# Patient Record
Sex: FEMALE | Race: WHITE | Employment: OTHER | URBAN - METROPOLITAN AREA
[De-identification: names, ages, dates, MRNs, and addresses within clinical notes are randomized per-mention and may not be internally consistent; named-entity substitution may affect disease eponyms.]

---

## 2019-02-20 ENCOUNTER — THERAPY VISIT (OUTPATIENT)
Dept: PHYSICAL THERAPY | Facility: CLINIC | Age: 74
End: 2019-02-20
Payer: COMMERCIAL

## 2019-02-20 DIAGNOSIS — Z47.1 AFTERCARE FOLLOWING SHOULDER JOINT REPLACEMENT SURGERY, UNSPECIFIED LATERALITY: ICD-10-CM

## 2019-02-20 DIAGNOSIS — Z96.619 AFTERCARE FOLLOWING SHOULDER JOINT REPLACEMENT SURGERY, UNSPECIFIED LATERALITY: ICD-10-CM

## 2019-02-20 DIAGNOSIS — M25.512 ACUTE PAIN OF LEFT SHOULDER: ICD-10-CM

## 2019-02-20 PROCEDURE — G8987 SELF CARE CURRENT STATUS: HCPCS | Mod: GP | Performed by: PHYSICAL THERAPIST

## 2019-02-20 PROCEDURE — 97161 PT EVAL LOW COMPLEX 20 MIN: CPT | Mod: GP | Performed by: PHYSICAL THERAPIST

## 2019-02-20 PROCEDURE — G8988 SELF CARE GOAL STATUS: HCPCS | Mod: GP | Performed by: PHYSICAL THERAPIST

## 2019-02-20 PROCEDURE — 97110 THERAPEUTIC EXERCISES: CPT | Mod: GP | Performed by: PHYSICAL THERAPIST

## 2019-02-20 NOTE — LETTER
[facility]  [address]  [place] [address]  [phone]    2019    Re: Danica Marie   :   1945  MRN:  [id number]   REFERRING PHYSICIAN:   [facility]    Date of Initial Evaluation:  19  Visits:  Rxs Used: 1  Reason for Referral:     Acute pain of left shoulder  Aftercare following shoulder joint replacement surgery, unspecified laterality    [facility] Initial Evaluation    Subjective:  SPADI    The history is provided by the patient. No  was used.   Danica Marie is a 73 year old female with a left shoulder condition.  Condition occurred with:  Repetition/overuse and degenerative joint disease.  Condition occurred: for unknown reasons.  This is a new condition  19.    Patient reports pain:  In the joint.  Radiates to:  Shoulder.  Pain is described as aching and is intermittent and reported as 8/10.  Associated symptoms:  Painful arc, loss of strength and loss of motion/stiffness. Pain is the same all the time.  Symptoms are exacerbated by carrying, lifting, lying on extremity, using arm behind back, using arm at shoulder level and using arm overhead (restricted arm movement to table top, no greater than a few ounces of weight and using a sling yet. ) and relieved by bracing/immobilizing, heat and ice.  Since onset symptoms are gradually improving.  Special tests:  X-ray and MRI.  Previous treatment includes physical therapy.  There was moderate and mild improvement following previous treatment.  General health as reported by patient is good.  Pertinent medical history includes:  Anemia, asthma, overweight, depression, fibromyalgia and osteoarthritis.  Medical allergies: yes (opiods ).  Other surgeries include:  Orthopedic surgery and other (R shoulder, L knee ).  Current medications:  Anti-depressants, high blood pressure medication, hormone replacement  therapy and pain medication (Tylenol).  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  Primary job tasks include:  Lifting, driving and prolonged sitting.  Barriers include:  None as reported by the patient.  Red flags:  None as reported by the patient.    Objective:  Standing Alignment:    Cervical/Thoracic:  Forward head and thoracic kyphosis increased  Shoulder/UE:  Rounded shoulders    Gait:    Gait Type:  Antalgic   Weight Bearing Status:  WBAT   Assistive Devices:  None  Flexibility/Screens:   Negative screens: Cervical   Neurological: She is alert. She has normal reflexes. No cranial nerve deficit or sensory deficit.     Shoulder Evaluation:  ROM:    PROM:    Flexion:  Left:  70    Right: 130    Internal Rotation:  Left:  40    Right:  80  External Rotation:  Left:  0    Right:  50    Palpation:  Palpation assessed shoulder: incision healing well and clean. She does cover the distal end of her incision with a band aid for sensitivity from clothes. and occasional drainage yet.     Assessment/Plan:    Patient is a 73 year old female with left side shoulder complaints.    Patient has the following significant findings with corresponding treatment plan.                Diagnosis 1:  L shoulder  rTSA   Pain -  hot/cold therapy, self management, education, directional preference exercise and home program  Decreased ROM/flexibility - manual therapy and therapeutic exercise  Decreased strength - therapeutic exercise and therapeutic activities    Therapy Evaluation Codes:   1) History comprised of:   Personal factors that impact the plan of care:      Age, Past/current experiences and post op MD protocol with precautions, contraindications and rehab progression phases .    Comorbidity factors that impact the plan of care are:      Depression, Fibromyalgia, High blood pressure, Osteoarthritis and Overweight.     Medications impacting care: Anti-depressant  and Pain.  2) Examination of Body Systems comprised of:   Body structures and functions that impact the plan of care:      Shoulder.   Activity limitations that impact the plan of care are:      Bathing, Cooking, Driving, Dressing, Lifting, Working, Sleeping and Laying down.  3) Clinical presentation characteristics are:   Stable/Uncomplicated.  4) Decision-Making    Low complexity using standardized patient assessment instrument and/or measureable assessment of functional outcome.    Cumulative Therapy Evaluation is: Low complexity.  Previous and current functional limitations:  (See Goal Flow Sheet for this information)    Short term and Long term goals: (See Goal Flow Sheet for this information)   Communication ability:  Patient appears to be able to clearly communicate and understand verbal and written communication and follow directions correctly.  Treatment Explanation - The following has been discussed with the patient:   RX ordered/plan of care  Anticipated outcomes  Possible risks and side effects  This patient would benefit from PT intervention to resume normal activities.   Rehab potential is good.    Frequency:  1 X week, once daily  Duration:  for 4 weeks tapering to 1 X a week  Every other week over 12 weeks  Discharge Plan:  Achieve all LTG.  Independent in home treatment program.  Reach maximal therapeutic benefit.    Thank you for your referral.    INQUIRIES  Therapist: Jason Hines, PT, ATC, Cert. [facility]  [address] [place] [address]  Phone: [phone]  Fax: [phone]

## 2019-02-21 PROBLEM — M25.512 ACUTE PAIN OF LEFT SHOULDER: Status: ACTIVE | Noted: 2019-02-21

## 2019-02-21 PROBLEM — Z96.619 AFTERCARE FOLLOWING SHOULDER JOINT REPLACEMENT SURGERY, UNSPECIFIED LATERALITY: Status: ACTIVE | Noted: 2019-02-21

## 2019-02-21 PROBLEM — Z47.1 AFTERCARE FOLLOWING SHOULDER JOINT REPLACEMENT SURGERY, UNSPECIFIED LATERALITY: Status: ACTIVE | Noted: 2019-02-21

## 2019-02-21 NOTE — PROGRESS NOTES
[facility] Initial Evaluation  Subjective:  SPADI 21/100       The history is provided by the patient. No  was used.   Danica Marie is a 73 year old female with a left shoulder condition.  Condition occurred with:  Repetition/overuse and degenerative joint disease.  Condition occurred: for unknown reasons.  This is a new condition  1/23/19.    Patient reports pain:  In the joint.  Radiates to:  Shoulder.  Pain is described as aching and is intermittent and reported as 8/10.  Associated symptoms:  Painful arc, loss of strength and loss of motion/stiffness. Pain is the same all the time.  Symptoms are exacerbated by carrying, lifting, lying on extremity, using arm behind back, using arm at shoulder level and using arm overhead (restricted arm movement to table top, no greater than a few ounces of weight and using a sling yet. ) and relieved by bracing/immobilizing, heat and ice.  Since onset symptoms are gradually improving.  Special tests:  X-ray and MRI.  Previous treatment includes physical therapy.  There was moderate and mild improvement following previous treatment.  General health as reported by patient is good.  Pertinent medical history includes:  Anemia, asthma, overweight, depression, fibromyalgia and osteoarthritis.  Medical allergies: yes (opiods ).  Other surgeries include:  Orthopedic surgery and other (R shoulder, L knee ).  Current medications:  Anti-depressants, high blood pressure medication, hormone replacement therapy and pain medication (Tylenol).  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  Primary job tasks include:  Lifting, driving and prolonged sitting.    Barriers include:  None as reported by the patient.    Red flags:  None as reported by the patient.                        Objective:  Standing Alignment:    Cervical/Thoracic:  Forward head and thoracic kyphosis  increased  Shoulder/UE:  Rounded shoulders              Gait:    Gait Type:  Antalgic   Weight Bearing Status:  WBAT   Assistive Devices:  None      Flexibility/Screens:   Negative screens: Cervical           Neurological: She is alert. She has normal reflexes. No cranial nerve deficit or sensory deficit.                      Shoulder Evaluation:  ROM:    PROM:    Flexion:  Left:  70    Right: 130          Internal Rotation:  Left:  40    Right:  80  External Rotation:  Left:  0    Right:  50                          Palpation:  Palpation assessed shoulder: incision healing well and clean. She does cover the distal end of her incision with a band aid for sensitivity from clothes. and occasional drainage yet.                                          General     ROS    Assessment/Plan:    Patient is a 73 year old female with left side shoulder complaints.    Patient has the following significant findings with corresponding treatment plan.                Diagnosis 1:  L shoulder  rTSA   Pain -  hot/cold therapy, self management, education, directional preference exercise and home program  Decreased ROM/flexibility - manual therapy and therapeutic exercise  Decreased strength - therapeutic exercise and therapeutic activities    Therapy Evaluation Codes:   1) History comprised of:   Personal factors that impact the plan of care:      Age, Past/current experiences and post op MD protocol with precautions, contraindications and rehab progression phases .    Comorbidity factors that impact the plan of care are:      Depression, Fibromyalgia, High blood pressure, Osteoarthritis and Overweight.     Medications impacting care: Anti-depressant and Pain.  2) Examination of Body Systems comprised of:   Body structures and functions that impact the plan of care:      Shoulder.   Activity limitations that impact the plan of care are:      Bathing, Cooking, Driving, Dressing, Lifting, Working, Sleeping and Laying down.  3) Clinical  presentation characteristics are:   Stable/Uncomplicated.  4) Decision-Making    Low complexity using standardized patient assessment instrument and/or measureable assessment of functional outcome.  Cumulative Therapy Evaluation is: Low complexity.    Previous and current functional limitations:  (See Goal Flow Sheet for this information)    Short term and Long term goals: (See Goal Flow Sheet for this information)     Communication ability:  Patient appears to be able to clearly communicate and understand verbal and written communication and follow directions correctly.  Treatment Explanation - The following has been discussed with the patient:   RX ordered/plan of care  Anticipated outcomes  Possible risks and side effects  This patient would benefit from PT intervention to resume normal activities.   Rehab potential is good.    Frequency:  1 X week, once daily  Duration:  for 4 weeks tapering to 1 X a week  Every other week over 12 weeks  Discharge Plan:  Achieve all LTG.  Independent in home treatment program.  Reach maximal therapeutic benefit.    Please refer to the daily flowsheet for treatment today, total treatment time and time spent performing 1:1 timed codes.

## 2019-03-06 ENCOUNTER — THERAPY VISIT (OUTPATIENT)
Dept: PHYSICAL THERAPY | Facility: CLINIC | Age: 74
End: 2019-03-06
Payer: COMMERCIAL

## 2019-03-06 DIAGNOSIS — Z47.1 AFTERCARE FOLLOWING LEFT SHOULDER JOINT REPLACEMENT SURGERY: ICD-10-CM

## 2019-03-06 DIAGNOSIS — Z96.612 AFTERCARE FOLLOWING LEFT SHOULDER JOINT REPLACEMENT SURGERY: ICD-10-CM

## 2019-03-06 DIAGNOSIS — M25.512 ACUTE PAIN OF LEFT SHOULDER: Primary | ICD-10-CM

## 2019-03-06 PROCEDURE — 97110 THERAPEUTIC EXERCISES: CPT | Mod: GP | Performed by: PHYSICAL THERAPIST

## 2019-03-06 PROCEDURE — 97112 NEUROMUSCULAR REEDUCATION: CPT | Mod: GP | Performed by: PHYSICAL THERAPIST

## 2019-03-29 ENCOUNTER — THERAPY VISIT (OUTPATIENT)
Dept: PHYSICAL THERAPY | Facility: CLINIC | Age: 74
End: 2019-03-29
Payer: COMMERCIAL

## 2019-03-29 DIAGNOSIS — M25.512 ACUTE PAIN OF LEFT SHOULDER: Primary | ICD-10-CM

## 2019-03-29 DIAGNOSIS — Z96.612 S/P REVERSE TOTAL SHOULDER ARTHROPLASTY, LEFT: ICD-10-CM

## 2019-03-29 PROCEDURE — G8987 SELF CARE CURRENT STATUS: HCPCS | Mod: GP | Performed by: PHYSICAL THERAPIST

## 2019-03-29 PROCEDURE — 97110 THERAPEUTIC EXERCISES: CPT | Mod: GP | Performed by: PHYSICAL THERAPIST

## 2019-03-29 PROCEDURE — G8988 SELF CARE GOAL STATUS: HCPCS | Mod: GP | Performed by: PHYSICAL THERAPIST

## 2019-03-29 NOTE — LETTER
[facility]  [address] [place] [id number] [phone]    2019    Re: Danica Marie   :   1945  MRN:  [id number]   REFERRING PHYSICIAN:   [facility]    Date of Initial Evaluation:  19  Visits:  Rxs Used: 3  Reason for Referral:     Acute pain of left shoulder  S/P reverse total shoulder arthroplasty, left    PROGRESS  REPORT  Progress reporting period is from 19 to 3/19/19. 3 visits .     SUBJECTIVE   9 weeks post op r TSA. Panfree at rest. Sleeping well and gradually using her arm with day to day activity. Danica is happy with her outcome to this point.    Initial Pain level: 810   Changes in function: Yes, see goal flow sheet for change in function   Adverse reactions: None    OBJECTIVE  70 IR, 45 ER, 130 flex, AROM .   Patient is currently following every 2-3 weeks in therapy for HEP management and inbetween by phone if needed.     HEP: scap retraction, supine reverse pendulum and ceiling punch, Adductor isometric, gentle table top stretch 3-4x./week, daily with ROM.     ASSESSMENT/PLAN  Updated problem list and treatment plan: Diagnosis 1:  Post op rTSA     STG/LTGs have been met or progress has been made towards goals:  Yes (See Goal flow sheet completed today.)  Assessment of Progress: The patient's condition is improving.  The patient's condition has potential to improve.  Self Management Plans:  Patient has been instructed in a home treatment program.    Recommendations:  This patient would benefit from continued therapy.     Frequency:  1 X week, every other week  once daily  Duration:  for 12 weeks    Thank you for your referral.    INQUIRIES  Therapist: Jason Hines, PT, ATC, Cert. MDT  [facility]  [address] [place] [address]  Phone: [phone]  Fax: [phone]

## 2019-03-31 NOTE — PROGRESS NOTES
Subjective:  HPI                    Objective:  System    Physical Exam    General     ROS    Assessment/Plan:    PROGRESS  REPORT    Progress reporting period is from 2/20/19 to 3/19/19. 3 visits .     SUBJECTIVE   9 weeks post op r TSA. Panfree at rest. Sleeping well and gradually using her arm with day to day activity. Danica is happy with her outcome to this point.        Initial Pain level: 8/10   Changes in function: Yes, see goal flow sheet for change in function   Adverse reactions: None;   ,         OBJECTIVE  : 70 IR, 45 ER, 130 flex, AROM .     Patient is currently following every 2-3 weeks in therapy for HEP management and inbetween by phone if needed.       HEP: scap retraction, supine reverse pendulum and ceiling punch, Adductor isometric, gentle table top stretch 3-4x./week, daily with ROM.     ASSESSMENT/PLAN  Updated problem list and treatment plan: Diagnosis 1:  Post op rTSA     STG/LTGs have been met or progress has been made towards goals:  Yes (See Goal flow sheet completed today.)  Assessment of Progress: The patient's condition is improving.  The patient's condition has potential to improve.  Self Management Plans:  Patient has been instructed in a home treatment program.    Recommendations:  This patient would benefit from continued therapy.     Frequency:  1 X week, every other week  once daily  Duration:  for 12 weeks        Please refer to the daily flowsheet for treatment today, total treatment time and time spent performing 1:1 timed codes.

## 2019-04-05 ENCOUNTER — THERAPY VISIT (OUTPATIENT)
Dept: PHYSICAL THERAPY | Facility: CLINIC | Age: 74
End: 2019-04-05
Payer: COMMERCIAL

## 2019-04-05 DIAGNOSIS — Z47.1 AFTERCARE FOLLOWING LEFT SHOULDER JOINT REPLACEMENT SURGERY: ICD-10-CM

## 2019-04-05 DIAGNOSIS — Z96.612 AFTERCARE FOLLOWING LEFT SHOULDER JOINT REPLACEMENT SURGERY: ICD-10-CM

## 2019-04-05 DIAGNOSIS — M25.512 ACUTE PAIN OF LEFT SHOULDER: Primary | ICD-10-CM

## 2019-04-05 PROCEDURE — G8987 SELF CARE CURRENT STATUS: HCPCS | Mod: GP | Performed by: PHYSICAL THERAPIST

## 2019-04-05 PROCEDURE — 97112 NEUROMUSCULAR REEDUCATION: CPT | Mod: GP | Performed by: PHYSICAL THERAPIST

## 2019-04-05 PROCEDURE — G8988 SELF CARE GOAL STATUS: HCPCS | Mod: GP | Performed by: PHYSICAL THERAPIST

## 2019-04-05 PROCEDURE — 97110 THERAPEUTIC EXERCISES: CPT | Mod: GP | Performed by: PHYSICAL THERAPIST

## 2019-05-09 ENCOUNTER — THERAPY VISIT (OUTPATIENT)
Dept: PHYSICAL THERAPY | Facility: CLINIC | Age: 74
End: 2019-05-09
Payer: COMMERCIAL

## 2019-05-09 DIAGNOSIS — M25.512 ACUTE PAIN OF LEFT SHOULDER: Primary | ICD-10-CM

## 2019-05-09 DIAGNOSIS — Z47.1 AFTERCARE FOLLOWING LEFT SHOULDER JOINT REPLACEMENT SURGERY: ICD-10-CM

## 2019-05-09 DIAGNOSIS — Z96.612 AFTERCARE FOLLOWING LEFT SHOULDER JOINT REPLACEMENT SURGERY: ICD-10-CM

## 2019-05-09 PROCEDURE — G8987 SELF CARE CURRENT STATUS: HCPCS | Mod: GP | Performed by: PHYSICAL THERAPIST

## 2019-05-09 PROCEDURE — 97110 THERAPEUTIC EXERCISES: CPT | Mod: GP | Performed by: PHYSICAL THERAPIST

## 2019-05-09 PROCEDURE — G8988 SELF CARE GOAL STATUS: HCPCS | Mod: GP | Performed by: PHYSICAL THERAPIST

## 2019-05-09 PROCEDURE — 97112 NEUROMUSCULAR REEDUCATION: CPT | Mod: GP | Performed by: PHYSICAL THERAPIST

## 2019-05-09 NOTE — LETTER
[facility]  [address]  [place] [address]  [phone]    May 14, 2019    Re: Danica Marie   :   1945  MRN:  [id number]   REFERRING PHYSICIAN:   [facility]    Date of Initial Evaluation:  19  Visits:  Rxs Used: 5  Reason for Referral:     Acute pain of left shoulder  Aftercare following left shoulder joint replacement surgery    PROGRESS  REPORT  Progress reporting period is from 2019 to May 9, 2019.  5 visits.    SUBJECTIVE  Danica is 12 weeks postoperative reverse total shoulder replacement by Dr. Husain.  She is pain-free.    She is happy with her outcome as she begins to engage back to most of her normal lifestyle.  Danica Is limited from  heavy lifting of greater than 10 pounds, reaching overhead and anything that would reproduce discomfort.    She is on track with postoperative reverse total shoulder replacement program.  She follows up with therapy on a biweekly basis at this point, emphasis to gradual functional range of motion restoration, modified strengthening program adherent to her physicians program.  Current Pain level: 1/10   Initial Pain level: 8/10   Changes in function: No changes noted in function since last SOAP note   Adverse reactions: None     OBJECTIVE  External rotation 50 degrees, internal rotation 85 degrees, active forward flexion 150 degrees.   Strength testing is not done at this point.    She avoids posterior reach, arm extension beyond midline, and lifting greater than 5 to 10 pounds such as grocery shopping is maximum.  Danica  has 1 additional appointment in physical therapy prior to following up with her physician.   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    ASSESSMENT/PLAN  Updated problem list and treatment plan: Diagnosis 1: Postoperative open left reverse total shoulder replacement and 3/13/2019 Decreased strength - therapeutic exercise and therapeutic activities  STG/LTGs have been met or progress has been made towards goals:  Yes (See Goal flow sheet completed today.)  Assessment of Progress: The patient's condition is improving.  The patient's condition has potential to improve.  Self Management Plans:  Patient has been instructed in a home treatment program.    Recommendations:  This patient would benefit from continued therapy.     Frequency: 1 X week, every other week, once daily  Duration:  for 12 weeks    This patient is ready to be discharged from therapy and continue their home treatment program.    Thank you for your referral.    INQUIRIES  Therapist: Jason Hines, PT, ATC, Cert. MDT  [facility]  [address] [place] [address]  Phone: [phone]  Fax: [phone]

## 2019-05-14 NOTE — PROGRESS NOTES
Subjective:  HPI                    Objective:  System    Physical Exam    General     ROS    Assessment/Plan:    PROGRESS  REPORT    Progress reporting period is from February 20, 2019 to May 9, 2019.  5 visits.    SUBJECTIVE   Danica is 12 weeks postoperative reverse total shoulder replacement by Dr. Husain.  She is pain-free.      She is happy with her outcome as she begins to engage back to most of her normal lifestyle.    Danica Is limited from  heavy lifting of greater than 10 pounds, reaching overhead and anything that would reproduce discomfort.      She is on track with postoperative reverse total shoulder replacement program.  She follows up with therapy on a biweekly basis at this point, emphasis to gradual functional range of motion restoration, modified strengthening program adherent to her physicians program.    Current Pain level: 1/10   Initial Pain level: 8/10   Changes in function: No changes noted in function since last SOAP note   Adverse reactions: None;   ,         OBJECTIVE  External rotation 50 degrees, internal rotation 85 degrees, active forward flexion 150 degrees.      Strength testing is not done at this point.      She avoids posterior reach, arm extension beyond midline, and lifting greater than 5 to 10 pounds such as grocery shopping is maximum.  Danica  has 1 additional appointment in physical therapy prior to following up with her physician.      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      ASSESSMENT/PLAN  Updated problem list and treatment plan: Diagnosis 1: Postoperative open left reverse total shoulder replacement and  3/13/2019 Decreased strength - therapeutic exercise and therapeutic activities  STG/LTGs have been met or progress has been made towards goals:  Yes (See Goal flow sheet completed today.)  Assessment of Progress: The patient's condition is improving.  The patient's condition has potential to improve.  Self Management Plans:  Patient has been instructed in a home treatment program.      Recommendations:  This patient would benefit from continued therapy.     Frequency: 1 X week, every other week, once daily  Duration:  for 12 weeks      This patient is ready to be discharged from therapy and continue their home treatment program.    Please refer to the daily flowsheet for treatment today, total treatment time and time spent performing 1:1 timed codes.

## 2019-05-24 ENCOUNTER — THERAPY VISIT (OUTPATIENT)
Dept: PHYSICAL THERAPY | Facility: CLINIC | Age: 74
End: 2019-05-24
Payer: COMMERCIAL

## 2019-05-24 PROCEDURE — 97110 THERAPEUTIC EXERCISES: CPT | Mod: GP | Performed by: PHYSICAL THERAPIST

## 2019-05-24 PROCEDURE — G8987 SELF CARE CURRENT STATUS: HCPCS | Mod: GP | Performed by: PHYSICAL THERAPIST

## 2019-05-24 PROCEDURE — G8988 SELF CARE GOAL STATUS: HCPCS | Mod: GP | Performed by: PHYSICAL THERAPIST

## 2019-05-24 NOTE — PROGRESS NOTES
Subjective:  HPI                    Objective:  System    Physical Exam    General     ROS    Assessment/Plan:    PROGRESS  REPORT    Progress reporting period is from April 25, 2019 to May 24, 2019. .     SUBJECTIVE  Danica is roughly 3 to 4 months postoperative left reverse total shoulder replacement.  She is pain-free at rest.  She has been seen in physical therapy for 6 visits.      She is able to resume most of her day-to-day function with a roughly about 20% limitation to be extreme overhead reach and heavy lifting.  She openly admits that she has had minutes with home exercise program primarily uses her arm on a day-to-day basis with a regular day activity but seems to be improving her overall function.      She is planning to be following up with Dr. Husain within a week for his consultation and most likely discharge from care with goals being met at that point.     Patient is extremely happy with her outcome of her left shoulder at this point      Initial Pain level: 8/10   Current pain level: 0/10  Changes in function: Yes, see goal flow sheet for change in function   Adverse reactions: None;   ,         OBJECTIVE   External rotation 50 degrees, internal rotation 80 degrees, active range of motion flexion roughly 150 degrees.      She is able to control her scapular positioning with her arm below shoulder height.      At this point is recommend that patient discontinue physical therapy as directed by Dr. Menjivar differently.  She is able to self manage and feels comfortable with transitioning away from therapy to self-care and day-to-day activity.      ASSESSMENT/PLAN  Updated problem list and treatment plan: Diagnosis   postoperative left reverse total shoulder replacement   STG/LTGs have been met or progress has been made towards goals:  Yes (See Goal flow sheet completed today.)  Assessment of Progress: The patient's condition is improving.  The patient's condition has potential to improve.  Self  Management Plans:  Patient is independent in a home treatment program.    Recommendations:  This patient is ready to be discharged from therapy and continue their home treatment program.  This patient would benefit from further evaluation.    Please refer to the daily flowsheet for treatment today, total treatment time and time spent performing 1:1 timed codes.

## 2019-05-24 NOTE — LETTER
[facility]  [address]  [place] [address]  [phone]    May 24, 2019    Re: Danica Marie   :   1945  MRN:  [id number]   REFERRING PHYSICIAN:   [facility]    Date of Initial Evaluation:  19  Visits:  Rxs Used: 6  Reason for Referral:  Aftercare following left shoulder joint replacement surgery     PROGRESS  REPORT  Progress reporting period is from 2019 to May 24, 2019. .     SUBJECTIVE  Danica is roughly 3 to 4 months postoperative left reverse total shoulder replacement.  She is pain-free at rest.  She has been seen in physical therapy for 6 visits.    She is able to resume most of her day-to-day function with a roughly about 20% limitation to be extreme overhead reach and heavy lifting.  She openly admits that she has had minutes with home exercise program primarily uses her arm on a day-to-day basis with a regular day activity but seems to be improving her overall function.    She is planning to be following up with Dr. Husain within a week for his consultation and most likely discharge from care with goals being met at that point.     Patient is extremely happy with her outcome of her left shoulder at this point  Initial Pain level: 8/10   Current pain level: 0/10  Changes in function: Yes, see goal flow sheet for change in function   Adverse reactions: None      OBJECTIVE   External rotation 50 degrees, internal rotation 80 degrees, active range of motion flexion roughly 150 degrees.    She is able to control her scapular positioning with her arm below shoulder height.    At this point is recommend that patient discontinue physical therapy as directed by Dr. Menjivar differently.  She is able to self manage and feels comfortable with transitioning away from therapy to self-care and day-to-day activity.      ASSESSMENT/PLAN  Updated problem list and treatment plan: Diagnosis   postoperative left reverse total  shoulder replacement   STG/LTGs have been met or progress has been made towards goals:  Yes (See Goal flow sheet completed today.)  Assessment of Progress: The patient's condition is improving.  The patient's condition has potential to improve.  Self Management Plans:  Patient is independent in a home treatment program.    Recommendations:  This patient is ready to be discharged from therapy and continue their home treatment program.  This patient would benefit from further evaluation.    Thank you for your referral.    INQUIRIES  Therapist: Jason Hines, PT, ATC, Cert. MDT  [facility]  [address]  [place] [address]  Phone: [phone]  Fax: [phone]

## 2024-02-10 ENCOUNTER — HEALTH MAINTENANCE LETTER (OUTPATIENT)
Age: 79
End: 2024-02-10